# Patient Record
Sex: FEMALE | Race: WHITE | NOT HISPANIC OR LATINO | Employment: STUDENT | URBAN - METROPOLITAN AREA
[De-identification: names, ages, dates, MRNs, and addresses within clinical notes are randomized per-mention and may not be internally consistent; named-entity substitution may affect disease eponyms.]

---

## 2024-04-28 ENCOUNTER — HOSPITAL ENCOUNTER (EMERGENCY)
Facility: OTHER | Age: 19
Discharge: HOME | End: 2024-04-28
Attending: EMERGENCY MEDICINE
Payer: COMMERCIAL

## 2024-04-28 VITALS
RESPIRATION RATE: 18 BRPM | OXYGEN SATURATION: 100 % | HEIGHT: 69 IN | DIASTOLIC BLOOD PRESSURE: 64 MMHG | TEMPERATURE: 98 F | HEART RATE: 70 BPM | WEIGHT: 134 LBS | SYSTOLIC BLOOD PRESSURE: 134 MMHG | BODY MASS INDEX: 19.85 KG/M2

## 2024-04-28 DIAGNOSIS — S06.0X0A CONCUSSION WITHOUT LOSS OF CONSCIOUSNESS, INITIAL ENCOUNTER: Primary | ICD-10-CM

## 2024-04-28 LAB
B-HCG UR QL: NEGATIVE
CTP QC/QA: YES

## 2024-04-28 PROCEDURE — 96361 HYDRATE IV INFUSION ADD-ON: CPT

## 2024-04-28 PROCEDURE — 96374 THER/PROPH/DIAG INJ IV PUSH: CPT

## 2024-04-28 PROCEDURE — 25000003 PHARM REV CODE 250: Performed by: EMERGENCY MEDICINE

## 2024-04-28 PROCEDURE — 63600175 PHARM REV CODE 636 W HCPCS: Performed by: EMERGENCY MEDICINE

## 2024-04-28 PROCEDURE — 81025 URINE PREGNANCY TEST: CPT | Performed by: EMERGENCY MEDICINE

## 2024-04-28 PROCEDURE — 99284 EMERGENCY DEPT VISIT MOD MDM: CPT | Mod: 25

## 2024-04-28 PROCEDURE — 96375 TX/PRO/DX INJ NEW DRUG ADDON: CPT

## 2024-04-28 RX ORDER — KETOROLAC TROMETHAMINE 30 MG/ML
10 INJECTION, SOLUTION INTRAMUSCULAR; INTRAVENOUS
Status: COMPLETED | OUTPATIENT
Start: 2024-04-28 | End: 2024-04-28

## 2024-04-28 RX ORDER — ONDANSETRON HYDROCHLORIDE 2 MG/ML
4 INJECTION, SOLUTION INTRAVENOUS
Status: COMPLETED | OUTPATIENT
Start: 2024-04-28 | End: 2024-04-28

## 2024-04-28 RX ORDER — SODIUM CHLORIDE 9 MG/ML
1000 INJECTION, SOLUTION INTRAVENOUS
Status: COMPLETED | OUTPATIENT
Start: 2024-04-28 | End: 2024-04-28

## 2024-04-28 RX ORDER — ONDANSETRON 8 MG/1
8 TABLET, ORALLY DISINTEGRATING ORAL EVERY 6 HOURS PRN
Qty: 15 TABLET | Refills: 0 | Status: SHIPPED | OUTPATIENT
Start: 2024-04-28

## 2024-04-28 RX ORDER — IBUPROFEN 600 MG/1
600 TABLET ORAL EVERY 8 HOURS PRN
Qty: 20 TABLET | Refills: 0 | Status: SHIPPED | OUTPATIENT
Start: 2024-04-28

## 2024-04-28 RX ADMIN — SODIUM CHLORIDE 1000 ML: 0.9 INJECTION, SOLUTION INTRAVENOUS at 08:04

## 2024-04-28 RX ADMIN — ONDANSETRON 4 MG: 2 INJECTION INTRAMUSCULAR; INTRAVENOUS at 08:04

## 2024-04-28 RX ADMIN — KETOROLAC TROMETHAMINE 10 MG: 30 INJECTION, SOLUTION INTRAMUSCULAR; INTRAVENOUS at 08:04

## 2024-04-29 NOTE — ED TRIAGE NOTES
Pt presents to ED c/o headache s/t hitting head on window onset this morning. Reports large bump to back of head. Endorses nausea, dizziness or photophobia. Reports taking  OTC tylenol that provided some relief. Denies weakness, vomiting or numbness. Aaox4, NAD noted.

## 2024-04-29 NOTE — ED PROVIDER NOTES
Encounter Date: 4/28/2024    SCRIBE #1 NOTE: I, Corrie Davila, am scribing for, and in the presence of,  Kristin Anderson MD. I have scribed the following portions of the note - Other sections scribed: HPI, ROS, and phsyical exam.       History     Chief Complaint   Patient presents with    Headache     C/o headache, nausea, dizziness, lump on head after hitting her on on the headboard this morning,      Time seen by provider: 8:25 PM    This is a 18 y.o. female with no significant PMHx who presents with complaint of headache onset this morning after hitting the back of her head on a window sill. She states that she noticed a large bump to the back of her head after, but this went down after applying ice. She adds that she is currently having some associated nausea, dizziness, and photophobia. She notes that she cannot look at her phone as of right now due to pain. She states that she took Tylenol, which provided minimal relief to her headache. She denies fever, chills, loss of consciousness, weakness, vomiting, numbness, and previous head injuries. Patient is currently on birth control.    This is the extent of the patient's complaints at this time.      The history is provided by the patient.     Review of patient's allergies indicates:  No Known Allergies  No past medical history on file.  No past surgical history on file.  No family history on file.     Review of Systems   Constitutional:  Negative for unexpected weight change.   HENT:  Negative for nosebleeds.    Eyes:  Positive for photophobia. Negative for pain.   Respiratory:  Negative for apnea.    Cardiovascular:  Negative for palpitations.   Gastrointestinal:  Positive for nausea. Negative for constipation and vomiting.   Genitourinary:  Negative for enuresis.   Skin:  Negative for pallor.   Neurological:  Positive for dizziness and headaches. Negative for syncope, weakness and numbness.   Hematological:  Does not bruise/bleed easily.    Psychiatric/Behavioral:  Negative for sleep disturbance.        Physical Exam     Initial Vitals [04/28/24 2008]   BP Pulse Resp Temp SpO2   134/64 70 18 98 °F (36.7 °C) 100 %      MAP       --         Physical Exam    Nursing note and vitals reviewed.  Constitutional: She appears well-developed and well-nourished. She does not have a sickly appearance. No distress.   HENT:   Head: Normocephalic and atraumatic.   Right Ear: External ear normal.   Left Ear: External ear normal.   Skin intact. Tenderness to palpation of occipital region.   Eyes: Conjunctivae, EOM and lids are normal. Pupils are equal, round, and reactive to light. Right eye exhibits no discharge. Left eye exhibits no discharge. Right conjunctiva is not injected. Right conjunctiva has no hemorrhage. Left conjunctiva is not injected. Left conjunctiva has no hemorrhage. No scleral icterus.   No photophobia.   Neck: Phonation normal. Neck supple. No stridor present. No tracheal deviation present.   Normal range of motion.  Cardiovascular:  Normal rate, regular rhythm and normal heart sounds.     Exam reveals no friction rub.       No murmur heard.  Pulses:       Radial pulses are 2+ on the right side and 2+ on the left side.        Dorsalis pedis pulses are 2+ on the right side and 2+ on the left side.   Musculoskeletal:      Cervical back: Normal range of motion and neck supple.     Neurological: She is alert and oriented to person, place, and time. She has normal strength. GCS eye subscore is 4. GCS verbal subscore is 5. GCS motor subscore is 6.   Speech normal.   Skin: Skin is warm.   Psychiatric: She has a normal mood and affect. Her speech is normal and behavior is normal. Judgment and thought content normal. Cognition and memory are normal.         ED Course   Procedures  Labs Reviewed   POCT URINE PREGNANCY          Imaging Results    None          Medications   0.9%  NaCl infusion (1,000 mLs Intravenous New Bag 4/28/24 2047)   ketorolac  injection 9.999 mg (9.999 mg Intravenous Given 4/28/24 2052)   ondansetron injection 4 mg (4 mg Intravenous Given 4/28/24 2052)     Medical Decision Making  18-year-old well-appearing female presents afebrile, hemodynamically stable and well-appearing with report of focal head pain at the site of her recent head injury.  She was reporting symptoms concerning for concussion including dizziness, nausea, and difficulty focusing.  Patient with no focal neurologic findings and no evidence of head trauma on exam.  I do feel a head CT is indicated.  I did discuss this with the patient who is in agreement.  Will treat symptoms with IV fluids, Toradol and Zofran.    10:15 PM  On reassessment patient has been able tolerate a p.o. challenge.  Will discharge home with a prescription for Zofran and ibuprofen as well as home care instructions.  Return precautions given.  She will be discharged at this time stable condition.    Amount and/or Complexity of Data Reviewed  Labs: ordered.    Risk  Prescription drug management.      Additional MDM:   Differential Diagnosis:   Migraine, head contusion, concussion, do not suspect skull fracture or intracranial hemorrhage given presentation.            Scribe Attestation:   Scribe #1: I performed the above scribed service and the documentation accurately describes the services I performed. I attest to the accuracy of the note.                         Physician Attestation for Scribe: I, Kristin Anderson  , reviewed documentation as scribed in my presence, which is both accurate and complete.      Clinical Impression:  Final diagnoses:  [S06.0X0A] Concussion without loss of consciousness, initial encounter (Primary)                 Kristin Anderson MD  04/28/24 [id number]

## 2024-05-01 ENCOUNTER — NURSE TRIAGE (OUTPATIENT)
Dept: ADMINISTRATIVE | Facility: CLINIC | Age: 19
End: 2024-05-01
Payer: COMMERCIAL

## 2024-05-02 NOTE — TELEPHONE ENCOUNTER
Pt recently dx w/ a concussion, calling with symptoms that have not improved. I have given her advice, per protocol. She verbalizes understanding and will call back with any questions/concerns or worsening symptoms.     Reason for Disposition   [1] Concussion symptoms staying SAME (not worse or better) AND [2] present > 3 days    Additional Information   Negative: Weakness (i.e., paralysis, loss of muscle strength) of the face, arm or leg on one side of the body   Negative: Loss of speech or garbled speech   Negative: Difficult to awaken or acting confused (e.g., disoriented, slurred speech)   Negative: Sounds like a life-threatening emergency to the triager   Negative: [1] Black eyes on both sides AND [2] onset within 24 hours of head injury   Negative: [1] Knocked out (unconscious) > 1 minute AND [2] no head CT Scan or MRI has been performed   Negative: Concussion symptoms are worsening   Negative: [1] Vomiting once or more AND [2] no head CT Scan or MRI has been performed   Negative: [1] Unsteady on feet (e.g., unable to stand or requires support to walk) AND [2] no head CT Scan or MRI has been performed   Negative: [1] Neck pain after dangerous injury (e.g., MVA, diving, trampoline, contact sports, fall > 10 feet or 3 meters) AND [2] no neck xray has been performed (e.g., c-spine xray or CT)   Negative: Patient sounds very sick or weak to the triager   Negative: [1] SEVERE headache (e.g., excruciating, pain scale 8-10) AND [2] not improved after pain medications   Negative: [1] Concussion symptoms SAME (not worse) AND [2]  taking Coumadin (warfarin) or other strong blood thinner, or known bleeding disorder (e.g., thrombocytopenia)   Negative: [1] Concussion symptoms SAME (not worse) AND [2]  known bleeding disorder (e.g., thrombocytopenia)   Negative: [1] Concussion symptoms staying SAME (not worse) AND [2] age > 60 years    Protocols used: Concussion (mTBI) Less Than 14 Days Ago Follow-up Call-A-